# Patient Record
Sex: MALE | Race: WHITE | Employment: OTHER | URBAN - METROPOLITAN AREA
[De-identification: names, ages, dates, MRNs, and addresses within clinical notes are randomized per-mention and may not be internally consistent; named-entity substitution may affect disease eponyms.]

---

## 2018-04-23 ENCOUNTER — OFFICE VISIT (OUTPATIENT)
Dept: INTERNAL MEDICINE CLINIC | Facility: CLINIC | Age: 73
End: 2018-04-23

## 2018-04-23 VITALS
HEIGHT: 70.5 IN | RESPIRATION RATE: 16 BRPM | SYSTOLIC BLOOD PRESSURE: 130 MMHG | DIASTOLIC BLOOD PRESSURE: 70 MMHG | HEART RATE: 60 BPM | WEIGHT: 279.5 LBS | TEMPERATURE: 98 F | OXYGEN SATURATION: 95 % | BODY MASS INDEX: 39.57 KG/M2

## 2018-04-23 DIAGNOSIS — I44.0 FIRST DEGREE ATRIOVENTRICULAR BLOCK: ICD-10-CM

## 2018-04-23 DIAGNOSIS — R73.01 IMPAIRED FASTING GLUCOSE: ICD-10-CM

## 2018-04-23 DIAGNOSIS — E78.00 PURE HYPERCHOLESTEROLEMIA: ICD-10-CM

## 2018-04-23 DIAGNOSIS — L03.031 PARONYCHIA OF GREAT TOE OF RIGHT FOOT: ICD-10-CM

## 2018-04-23 DIAGNOSIS — L02.213 CHEST WALL ABSCESS: Primary | ICD-10-CM

## 2018-04-23 DIAGNOSIS — I25.10 CORONARY ARTERY DISEASE INVOLVING NATIVE CORONARY ARTERY OF NATIVE HEART WITHOUT ANGINA PECTORIS: ICD-10-CM

## 2018-04-23 DIAGNOSIS — I10 ESSENTIAL HYPERTENSION: ICD-10-CM

## 2018-04-23 PROCEDURE — 99204 OFFICE O/P NEW MOD 45 MIN: CPT | Performed by: INTERNAL MEDICINE

## 2018-04-23 RX ORDER — AMOXICILLIN AND CLAVULANATE POTASSIUM 875; 125 MG/1; MG/1
1 TABLET, FILM COATED ORAL 2 TIMES DAILY
Qty: 14 TABLET | Refills: 0 | Status: SHIPPED | OUTPATIENT
Start: 2018-04-23 | End: 2018-04-30

## 2018-04-23 NOTE — PATIENT INSTRUCTIONS
- Follow up with General Surgery for your chest abscess - we have set you up for an appointment with Dr. Don Duran at 2 pm.    - We will start you on antibiotics (Augmentin).   Take 1 capsule twice daily with with food for the next week  - Keep an eye on your t

## 2018-04-23 NOTE — PROGRESS NOTES
Ajay Calixto is a 72year old male.    HPI:   Patient presents with:  Establish Care:  Cardiologist: Dr Brandie Tejeda in [facility] (Requested records)     Last PCP: Dr Iraida Cuenca: On right side of chest. Pt states has had it for a long time but has n tenderness  LUNGS: denies shortness of breath   CARDIOVASCULAR: denies chest pain  GI: denies nausea/emesis/ abdominal pain diarrhea constipation  : denies dysuria   MUSCULOSKELETAL: no arthalgias  NEURO: denies headaches  PSYCHIATRIC: denies issues  END normal lid and conjunctiva  NECK: supple, no jvd, no thyromegaly  LUNGS: clear to auscultation bilaterally, no wheezing/rubs  CARDIO: RRR without murmurs. No clubbing, cyanosis or edema.   GI: soft non tender nondistended no hepatosplenomegaly, bowel sound

## 2018-04-24 ENCOUNTER — OFFICE VISIT (OUTPATIENT)
Dept: SURGERY | Facility: CLINIC | Age: 73
End: 2018-04-24

## 2018-04-24 VITALS
BODY MASS INDEX: 38.64 KG/M2 | SYSTOLIC BLOOD PRESSURE: 137 MMHG | TEMPERATURE: 98 F | HEIGHT: 71 IN | DIASTOLIC BLOOD PRESSURE: 79 MMHG | HEART RATE: 62 BPM | WEIGHT: 276 LBS | RESPIRATION RATE: 18 BRPM

## 2018-04-24 DIAGNOSIS — L72.3 SEBACEOUS CYST: Primary | ICD-10-CM

## 2018-04-24 PROBLEM — Z79.01 ANTICOAGULATED: Status: ACTIVE | Noted: 2018-04-24

## 2018-04-24 PROBLEM — I25.10 CORONARY ARTERY DISEASE INVOLVING NATIVE CORONARY ARTERY OF NATIVE HEART WITHOUT ANGINA PECTORIS: Status: ACTIVE | Noted: 2018-04-24

## 2018-04-24 PROCEDURE — 99242 OFF/OP CONSLTJ NEW/EST SF 20: CPT | Performed by: SURGERY

## 2018-04-24 NOTE — H&P
New Patient Visit Note       Active Problems      1.  Sebaceous cyst        Chief Complaint   Patient presents with:  Abscess: New pt ref by Dr. Muriel Hardin for chest ascess-pain to righ side upper chest      History of Present Illness     72year-old gentleman Disp: 14 tablet Rfl: 0   Losartan Potassium (COZAAR) 50 MG Oral Tab Take 50 mg by mouth daily. Disp:  Rfl:    hydrochlorothiazide (HYDRODIURIL) 25 MG Oral Tab Take 25 mg by mouth daily.  Disp:  Rfl:    METOPROLOL TARTRATE 25 MG OR TABS 1/2  TABLET TWICE ANNITA motion. Neurological: He is alert and oriented to person, place, and time. Skin: No rash noted.            Assessment   Sebaceous cyst  (primary encounter diagnosis)  30-year-old gentleman here for evaluation of cyst on the right chest wall  Patient sta

## 2018-04-25 ENCOUNTER — TELEPHONE (OUTPATIENT)
Dept: SURGERY | Facility: CLINIC | Age: 73
End: 2018-04-25

## 2018-04-25 ENCOUNTER — APPOINTMENT (OUTPATIENT)
Dept: LAB | Age: 73
End: 2018-04-25
Payer: COMMERCIAL

## 2018-04-25 DIAGNOSIS — L72.3 SEBACEOUS CYST: ICD-10-CM

## 2018-04-25 PROCEDURE — 93005 ELECTROCARDIOGRAM TRACING: CPT

## 2018-04-25 PROCEDURE — 93010 ELECTROCARDIOGRAM REPORT: CPT | Performed by: INTERNAL MEDICINE

## 2018-04-25 PROCEDURE — 36415 COLL VENOUS BLD VENIPUNCTURE: CPT

## 2018-04-25 PROCEDURE — 80048 BASIC METABOLIC PNL TOTAL CA: CPT

## 2018-04-25 NOTE — TELEPHONE ENCOUNTER
Per Tutu Gaines at Dr. Henao Null office pt may stop plavix for 5 days, called pt to inform him. Office sending written verification.

## 2018-04-25 NOTE — TELEPHONE ENCOUNTER
Contacted pt to see if he spoke with cardiologist about stopping plavix. Pt states the cardiologist got the letter and it is on his desk for review and that they will call pt back later today.  Reviewed with pt that today is the day if approved that he woul

## 2018-04-26 ENCOUNTER — TELEPHONE (OUTPATIENT)
Dept: SURGERY | Facility: CLINIC | Age: 73
End: 2018-04-26

## 2018-04-26 RX ORDER — ACETAMINOPHEN 500 MG
1000 TABLET ORAL ONCE
Status: CANCELLED | OUTPATIENT
Start: 2018-04-26

## 2018-04-27 ENCOUNTER — ANESTHESIA EVENT (OUTPATIENT)
Dept: SURGERY | Facility: HOSPITAL | Age: 73
End: 2018-04-27

## 2018-04-27 NOTE — PAT NURSING NOTE
I spoke with Jp Rivas at Dr. Mosher Running office requesting the note of cardiac clearance. She states that she will give the message to his staff to have them fax the clearance note when available.

## 2018-04-30 ENCOUNTER — HOSPITAL ENCOUNTER (OUTPATIENT)
Facility: HOSPITAL | Age: 73
Setting detail: HOSPITAL OUTPATIENT SURGERY
Discharge: HOME | End: 2018-04-30
Attending: SURGERY | Admitting: SURGERY
Payer: COMMERCIAL

## 2018-04-30 ENCOUNTER — SURGERY (OUTPATIENT)
Age: 73
End: 2018-04-30

## 2018-04-30 ENCOUNTER — ANESTHESIA (OUTPATIENT)
Dept: SURGERY | Facility: HOSPITAL | Age: 73
End: 2018-04-30

## 2018-04-30 VITALS
TEMPERATURE: 98 F | HEIGHT: 71 IN | RESPIRATION RATE: 14 BRPM | HEART RATE: 60 BPM | DIASTOLIC BLOOD PRESSURE: 76 MMHG | BODY MASS INDEX: 39.81 KG/M2 | OXYGEN SATURATION: 100 % | SYSTOLIC BLOOD PRESSURE: 115 MMHG | WEIGHT: 284.38 LBS

## 2018-04-30 DIAGNOSIS — L72.3 SEBACEOUS CYST: Primary | ICD-10-CM

## 2018-04-30 PROCEDURE — 88304 TISSUE EXAM BY PATHOLOGIST: CPT | Performed by: SURGERY

## 2018-04-30 PROCEDURE — 82962 GLUCOSE BLOOD TEST: CPT

## 2018-04-30 PROCEDURE — 0HB5XZZ EXCISION OF CHEST SKIN, EXTERNAL APPROACH: ICD-10-PCS | Performed by: SURGERY

## 2018-04-30 RX ORDER — BUPIVACAINE HYDROCHLORIDE 5 MG/ML
INJECTION, SOLUTION EPIDURAL; INTRACAUDAL AS NEEDED
Status: DISCONTINUED | OUTPATIENT
Start: 2018-04-30 | End: 2018-04-30 | Stop reason: HOSPADM

## 2018-04-30 RX ORDER — SODIUM CHLORIDE, SODIUM LACTATE, POTASSIUM CHLORIDE, CALCIUM CHLORIDE 600; 310; 30; 20 MG/100ML; MG/100ML; MG/100ML; MG/100ML
INJECTION, SOLUTION INTRAVENOUS CONTINUOUS
Status: DISCONTINUED | OUTPATIENT
Start: 2018-04-30 | End: 2018-04-30

## 2018-04-30 RX ORDER — HEPARIN SODIUM 5000 [USP'U]/ML
5000 INJECTION, SOLUTION INTRAVENOUS; SUBCUTANEOUS ONCE
Status: COMPLETED | OUTPATIENT
Start: 2018-04-30 | End: 2018-04-30

## 2018-04-30 RX ORDER — CEFAZOLIN SODIUM/WATER 2 G/20 ML
2 SYRINGE (ML) INTRAVENOUS ONCE
Status: COMPLETED | OUTPATIENT
Start: 2018-04-30 | End: 2018-04-30

## 2018-04-30 RX ORDER — LIDOCAINE HYDROCHLORIDE AND EPINEPHRINE 10; 10 MG/ML; UG/ML
INJECTION, SOLUTION INFILTRATION; PERINEURAL AS NEEDED
Status: DISCONTINUED | OUTPATIENT
Start: 2018-04-30 | End: 2018-04-30 | Stop reason: HOSPADM

## 2018-04-30 NOTE — H&P (VIEW-ONLY)
New Patient Visit Note       Active Problems      1.  Sebaceous cyst        Chief Complaint   Patient presents with:  Abscess: New pt ref by Dr. Jann Wallis for chest ascess-pain to righ side upper chest      History of Present Illness     72year-old gentleman Disp: 14 tablet Rfl: 0   Losartan Potassium (COZAAR) 50 MG Oral Tab Take 50 mg by mouth daily. Disp:  Rfl:    hydrochlorothiazide (HYDRODIURIL) 25 MG Oral Tab Take 25 mg by mouth daily.  Disp:  Rfl:    METOPROLOL TARTRATE 25 MG OR TABS 1/2  TABLET TWICE ANNITA motion. Neurological: He is alert and oriented to person, place, and time. Skin: No rash noted.            Assessment   Sebaceous cyst  (primary encounter diagnosis)  70-year-old gentleman here for evaluation of cyst on the right chest wall  Patient sta

## 2018-04-30 NOTE — OPERATIVE REPORT
[facility]  Operative Note    Raj Avilez Location: OR   [facility] [id number] MRN [id number]   Admission Date 4/30/2018 Operation Date 4/30/2018   Attending Physician Cindy Varner MD Operating Physician Megan Leone MD     Date of procedure: [place] Sasha given.  The patient was prepped and draped in usual sterile fashion. Using local anesthesia an incision was made in the area of the preoperatively marked lesion. The lesion was excised in its entirety down to the subcutaneous tissue.  The wound was then co

## 2018-04-30 NOTE — INTERVAL H&P NOTE
Pre-op Diagnosis: Sebaceous cyst [L72.3]    The above referenced H&P was reviewed by Ade Cervantes MD on 4/30/2018, the patient was examined and no significant changes have occurred in the patient's condition since the H&P was performed.   I discussed wit

## 2018-04-30 NOTE — OR PREOP
Spoke with Dr. Usha Leblanc and Dr. Casey Bahena regarding elevated blood sugar. Per these doctors instructions, patient and spouse instructed to follow up with PCP regarding elevated blood sugar.   Also per Dr Usha Leblanc, patient told to follow up with PCP regarding bethany

## 2018-04-30 NOTE — BRIEF OP NOTE
Pre-Operative Diagnosis: Sebaceous cyst [L72.3]     Post-Operative Diagnosis: Sebaceous cyst [L72.3]      Procedure Performed:   Procedure(s):  EXCISION SEBACEOUS CYST RIGHT CHEST WALL    Surgeon(s) and Role:     Teresa Posey MD - Primary    [place]

## 2018-04-30 NOTE — ANESTHESIA PREPROCEDURE EVALUATION
PRE-OP EVALUATION    Patient Name: Tequila Portillo    Pre-op Diagnosis: Sebaceous cyst [L72.3]    Procedure(s):  EXCISION SEBACEOUS CYST RIGHT CHEST WALL    Surgeon(s) and Role:     Caroline Gibson MD - Primary    Pre-op vitals reviewed.   Temp: 98 °F ( History:  1-4-13: CATARACT      Comment: right eye  1-25-13: CATARACT      Comment: left eye  No date: KNEE REPLACEMENT SURGERY Bilateral  No date: STENT PLACEMT RETRO CAROTID     Smoking status: Never Smoker    Smokeless tobacco: Never Used    Alcohol use

## 2018-05-01 ENCOUNTER — TELEPHONE (OUTPATIENT)
Dept: INTERNAL MEDICINE CLINIC | Facility: CLINIC | Age: 73
End: 2018-05-01

## 2018-05-01 NOTE — TELEPHONE ENCOUNTER
For staff:  Please contact patient had have him schedule follow up with me within next month for his physical.     For chart/documentation:  Reviewed records from cardiologist, Dr. Mena Choi. Chronic 1st degree heart block as mentioned above.   Gets annual

## 2018-05-10 ENCOUNTER — OFFICE VISIT (OUTPATIENT)
Dept: SURGERY | Facility: CLINIC | Age: 73
End: 2018-05-10

## 2018-05-10 VITALS
HEIGHT: 72 IN | WEIGHT: 185 LBS | DIASTOLIC BLOOD PRESSURE: 78 MMHG | HEART RATE: 73 BPM | RESPIRATION RATE: 19 BRPM | SYSTOLIC BLOOD PRESSURE: 145 MMHG | TEMPERATURE: 98 F | BODY MASS INDEX: 25.06 KG/M2

## 2018-05-10 DIAGNOSIS — L76.34 POSTPROCEDURAL SEROMA OF SKIN AND SUBCUTANEOUS TISSUE FOLLOWING OTHER PROCEDURE: ICD-10-CM

## 2018-05-10 DIAGNOSIS — L72.3 INFLAMED SEBACEOUS CYST: Primary | ICD-10-CM

## 2018-05-10 PROCEDURE — 99024 POSTOP FOLLOW-UP VISIT: CPT | Performed by: PHYSICIAN ASSISTANT

## 2018-05-10 RX ORDER — CEPHALEXIN 500 MG/1
500 CAPSULE ORAL 2 TIMES DAILY
Qty: 14 CAPSULE | Refills: 0 | Status: SHIPPED | OUTPATIENT
Start: 2018-05-10

## 2018-05-10 NOTE — PROGRESS NOTES
Post Operative Visit Note       Active Problems  1. Inflamed sebaceous cyst    2.  Postprocedural seroma of skin and subcutaneous tissue following other procedure         Chief Complaint   Patient presents with:  Post-Op: est pt,postop-removal of stiches-de Other Topics            Concern  Caffeine Concern        Yes    Comment:two to three per day  Exercise                No         Current Outpatient Prescriptions:  Losartan Potassium (COZAAR) 50 MG Oral Tab Take 50 mg by mouth daily.  Disp:  Rfl:    hydro range of motion. Cardiovascular: Normal rate and regular rhythm. Pulmonary/Chest: Effort normal. No respiratory distress. Incision in the right chest wall is clean, dry, and intact. There is overlying blanching erythema.   There is underlying fl of this seroma. All questions were answered. Imaging & Referrals   None    Follow Up  Return in 12 days (on 5/22/2018) for follow up seroma.     DARRICK Abrams

## 2018-05-22 ENCOUNTER — OFFICE VISIT (OUTPATIENT)
Dept: SURGERY | Facility: CLINIC | Age: 73
End: 2018-05-22

## 2018-05-22 VITALS
HEART RATE: 60 BPM | TEMPERATURE: 98 F | BODY MASS INDEX: 39.34 KG/M2 | HEIGHT: 71 IN | DIASTOLIC BLOOD PRESSURE: 74 MMHG | WEIGHT: 281 LBS | SYSTOLIC BLOOD PRESSURE: 136 MMHG

## 2018-05-22 DIAGNOSIS — L76.34 POSTPROCEDURAL SEROMA OF SKIN AND SUBCUTANEOUS TISSUE FOLLOWING OTHER PROCEDURE: Primary | ICD-10-CM

## 2018-05-22 PROCEDURE — 99212 OFFICE O/P EST SF 10 MIN: CPT | Performed by: PHYSICIAN ASSISTANT

## 2018-05-22 NOTE — PROGRESS NOTES
Post Operative Visit Note       Active Problems  1. Postprocedural seroma of skin and subcutaneous tissue following other procedure         Chief Complaint   Patient presents with:  Post-Op: POST-OP-EXCISION SEBACEOUS CYST RIGHT CHEST WALL DONE ON 4/30.  st Smokeless tobacco: Never Used                        Alcohol use:  Yes                Comment: social    Drug use: No            Other Topics            Concern  Caffeine Concern        Yes    Comment:two to three per day  Exe 281 lb   BMI 39.19 kg/m²   Physical Exam   Constitutional: He is oriented to person, place, and time. He appears well-developed and well-nourished. No distress. HENT:   Head: Normocephalic and atraumatic.    Mouth/Throat: Oropharynx is clear and moist.

## 2021-03-05 DIAGNOSIS — Z23 NEED FOR VACCINATION: ICD-10-CM

## (undated) DEVICE — KENDALL SCD EXPRESS SLEEVES, KNEE LENGTH, MEDIUM: Brand: KENDALL SCD

## (undated) DEVICE — SUTURE VICRYL 3-0 SH

## (undated) DEVICE — SOL  .9 1000ML BTL

## (undated) DEVICE — SUTURE VICRYL 5-0 P-3

## (undated) DEVICE — CHLORAPREP 26ML APPLICATOR

## (undated) DEVICE — MINI LAP PACK-LF: Brand: MEDLINE INDUSTRIES, INC.

## (undated) DEVICE — GAUZE SPONGES,USP TYPE VII GAUZE, 12 PLY: Brand: CURITY

## (undated) DEVICE — SUTURE ETHILON 3-0 PS-2

## (undated) DEVICE — GLOVE BIOGEL M SURG SZ 6-1/2

## (undated) DEVICE — 3M(TM) MICROPORE TAPE DISPENSER 1535-2: Brand: 3M™ MICROPORE™

## (undated) NOTE — LETTER
18    Patient: Justyna Cordero  : 3/3/1945 Visit date: 2018    Dear  Dr. Chung Black MD,    Thank you for referring Justyna Cordero to my practice. Please find my assessment and plan below.          Assessment   Postprocedural seroma of skin a

## (undated) NOTE — LETTER
18    Patient: Mary Conrad  : 3/3/1945 Visit date: 2018    Dear  Dr. Fabiola Garcia MD,    Thank you for referring Mary Conrad to my practice. Please find my assessment and plan below.           74-year-old gentleman here for evaluation o

## (undated) NOTE — LETTER
05/10/18    Patient: Ervin Alanis  : 3/3/1945 Visit date: 5/10/2018    Dear  Dr. Gissell Mcnair MD,    Thank you for referring Ervin Alanis to my practice. Please find my assessment and plan below.          Assessment   Inflamed sebaceous cyst  (prima

## (undated) NOTE — LETTER
[facility] Department  Phone: [phone]  Right Fax: [phone]  [facility] [place] [facility] By:  Derrick Grimes RN Date: 4/25/18    Patient Name: Dejan Fruit  Surgery Date: 4/30/2018    CSN: [id number]  Medical Record: [facility]

## (undated) NOTE — LETTER
Moose Swanson M.D., F.A.C.S.     Surgical Clearance Needed    Date: 4/24/2018                                                                       From: Luan Young    Attn: Dr. Alisa Blount